# Patient Record
Sex: FEMALE | Race: WHITE | Employment: FULL TIME | URBAN - METROPOLITAN AREA
[De-identification: names, ages, dates, MRNs, and addresses within clinical notes are randomized per-mention and may not be internally consistent; named-entity substitution may affect disease eponyms.]

---

## 2017-12-02 PROBLEM — K80.20 SYMPTOMATIC CHOLELITHIASIS: Status: ACTIVE | Noted: 2017-12-02

## 2017-12-18 PROBLEM — K80.20 SYMPTOMATIC CHOLELITHIASIS: Status: RESOLVED | Noted: 2017-12-02 | Resolved: 2017-12-18

## 2017-12-18 PROBLEM — Z90.49 S/P LAPAROSCOPIC CHOLECYSTECTOMY: Status: ACTIVE | Noted: 2017-12-18

## 2019-12-10 ENCOUNTER — HOSPITAL ENCOUNTER (OUTPATIENT)
Dept: MAMMOGRAPHY | Age: 59
Discharge: HOME | End: 2019-12-12
Payer: COMMERCIAL

## 2019-12-10 DIAGNOSIS — Z12.39 BREAST CANCER SCREENING: ICD-10-CM

## 2019-12-10 PROCEDURE — 77063 BREAST TOMOSYNTHESIS BI: CPT

## 2024-09-24 ENCOUNTER — TELEPHONE (OUTPATIENT)
Dept: ADMINISTRATIVE | Age: 64
End: 2024-09-24

## 2025-01-20 ASSESSMENT — RHEUMATOLOGY NEW PATIENT QUESTIONNAIRE
BLOOD IN STOOLS: N
NIGHT SWEATS: N
DEPRESSION: N
COLOR CHANGES OF HANDS OR FEET IN THE COLD: N
BEHAVIORAL CHANGES: N
SUN SENSITIVE (SUN ALLERGY): N
COUGH: N
STOMACH PAIN: Y
HOARSE VOICE: N
MUSCLE WEAKNESS: Y
SKIN REDNESS: N
MORNING STIFFNESS IN LOWER BACK: N
SWOLLEN OR TENDER GLANDS: N
LOSS OF VISION: N
MEMORY LOSS: N
SWOLLEN LEGS OR FEET: N
NUMBNESS OR TINGLING IN HANDS OR FEET: Y
UNUSUAL BLEEDING: N
EYE REDNESS: N
UNUSUAL FATIGUE: N
EYE DRYNESS: N
AGITATION: N
SKIN TIGHTNESS: Y
EASY BRUISING: N
EXCESSIVE HAIR LOSS (MORE THAN YOUR NORM): N
SORES IN MOUTH OR NOSE: N
PAIN OR BURNING ON URINATION: N
DIFFICULTY SWALLOWING: N
VOMITING OF BLOOD OR COFFEE GROUND CONSISTENCY MATERIAL: N
VAGINAL DRYNESS: N
NAUSEA: N
ANXIETY: N
PERSISTENT DIARRHEA: N
ABNORMAL URINE: N
SEIZURES: N
RASH OR ULCERS: N
UNUSUALLY RAPID OR SLOWED HEART RATE: N
JOINT SWELLING: N
CHEST PAIN: N
JOINT PAIN: N
HEARTBURN OR REFLUX: N
ANEMIA: N
DOUBLE OR BLURRED VISION: N
HEADACHES: N
RASH: N
DIFFICULTY FALLING ASLEEP: Y
HOW WOULD YOU DESCRIBE YOUR STIFFNESS ON AVERAGE: MODERATE
NODULES/BUMPS: N
EYE PAIN: N
INCREASED SUSCEPTIBILITY TO INFECTION: N
BLACK STOOLS: N
EASILY LOSING TEMPER: N
SHORTNESS OF BREATH: N
FEVER: N
DIFFICULTY STAYING ASLEEP: Y
LOSS OF CONSCIOUSNESS: N
MORNING STIFFNESS: Y
JAUNDICE: N
DRYNESS OF MOUTH: N
DIFFICULTY BREATHING LYING DOWN: N
UNEXPLAINED HEARING LOSS: N
FAINTING: N
UNEXPLAINED WEIGHT CHANGE: N

## 2025-01-21 ENCOUNTER — OFFICE VISIT (OUTPATIENT)
Dept: RHEUMATOLOGY | Age: 65
End: 2025-01-21
Payer: COMMERCIAL

## 2025-01-21 VITALS
OXYGEN SATURATION: 100 % | HEIGHT: 67 IN | WEIGHT: 235 LBS | BODY MASS INDEX: 36.88 KG/M2 | SYSTOLIC BLOOD PRESSURE: 135 MMHG | DIASTOLIC BLOOD PRESSURE: 85 MMHG | HEART RATE: 80 BPM

## 2025-01-21 DIAGNOSIS — R76.8 POSITIVE ANA (ANTINUCLEAR ANTIBODY): Primary | ICD-10-CM

## 2025-01-21 PROCEDURE — 99204 OFFICE O/P NEW MOD 45 MIN: CPT | Performed by: INTERNAL MEDICINE

## 2025-01-21 RX ORDER — MELOXICAM 15 MG/1
15 TABLET ORAL DAILY PRN
COMMUNITY
Start: 2024-12-02 | End: 2025-06-01

## 2025-01-21 RX ORDER — ZOLPIDEM TARTRATE 5 MG/1
5 TABLET ORAL NIGHTLY PRN
COMMUNITY
Start: 2024-11-18

## 2025-01-21 ASSESSMENT — ENCOUNTER SYMPTOMS
NAUSEA: 0
TROUBLE SWALLOWING: 0
COUGH: 0
DIARRHEA: 0
COLOR CHANGE: 0
VOMITING: 0
SHORTNESS OF BREATH: 0
ABDOMINAL PAIN: 0

## 2025-01-21 NOTE — PROGRESS NOTES
Nicky Ruth 1960 is a 64 y.o. female, here for evaluation of the following chief complaint(s):  New Patient (Nicky is here as a new patient for Positive Kasey )      Assessment & Plan   ASSESSMENT/PLAN:  Nicky Ruth 1960 is a 64 y.o. female seen in consult for positive KASEY.    1.  Positive KASEY-she has a titer of 1: 640 which is a significantly elevated titer.  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  I see no obvious clinical signs to suggest an underlying systemic connective tissue disease otherwise I have personally reviewed records and serologic workup has otherwise been unrevealing in the past.  My suspicion for underlying systemic connective tissue disease is on the lower end but will need further workup as below.  Roughly 15% of the population will have a positive KASEY without an underlying disorder.    If workup below is unrevealing she can follow-up with me on a as needed basis.  If I see anything concerning on workup below I will have her back for follow-up as appropriate.    1. Positive KASEY (antinuclear antibody)  -     C3 Complement; Future  -     C4 Complement; Future  -     CBC with Auto Differential; Future  -     Comprehensive Metabolic Panel; Future  -     Anti-DNA Antibody, Double-Stranded; Future  -     Urinalysis; Future  -     BECKA Profile; Future      Return if symptoms worsen or fail to improve.         Subjective   SUBJECTIVE/OBJECTIVE:    HPI: Nicky Ruth 1960 is a 64 y.o. female seen in consult for positive KASEY.    History of Present Illness  The patient is a 64-year-old female who presents for evaluation of a positive KASEY blood test.    She was referred to our facility following a positive KASEY blood test. She has been diagnosed with Charcot-Sharmila-Tooth (CMT) neuropathy, confirmed through three separate tests. On 03/25/2024, she experienced difficulty walking, characterized by leg tremors and numbness from the knee down.

## 2025-07-03 ENCOUNTER — APPOINTMENT (OUTPATIENT)
Dept: NEUROLOGY | Facility: CLINIC | Age: 65
End: 2025-07-03

## 2025-09-11 ENCOUNTER — APPOINTMENT (OUTPATIENT)
Dept: NEUROLOGY | Facility: CLINIC | Age: 65
End: 2025-09-11